# Patient Record
Sex: FEMALE | NOT HISPANIC OR LATINO | Employment: UNEMPLOYED | URBAN - METROPOLITAN AREA
[De-identification: names, ages, dates, MRNs, and addresses within clinical notes are randomized per-mention and may not be internally consistent; named-entity substitution may affect disease eponyms.]

---

## 2017-03-24 RX ORDER — LEVOTHYROXINE SODIUM 50 UG/1
TABLET ORAL
Qty: 30 TABLET | Refills: 0 | Status: SHIPPED | OUTPATIENT
Start: 2017-03-24 | End: 2018-04-23 | Stop reason: SDUPTHER

## 2017-03-27 NOTE — TELEPHONE ENCOUNTER
Left a message on voice mail informing pt that a 30 day refill was sent to the pharmacy. Will need a f/u to get further refills.

## 2017-08-16 ENCOUNTER — TELEPHONE (OUTPATIENT)
Dept: INTERNAL MEDICINE | Facility: CLINIC | Age: 52
End: 2017-08-16

## 2017-08-16 NOTE — TELEPHONE ENCOUNTER
----- Message from Bev Vernon sent at 8/16/2017  3:23 PM CDT -----  Contact: [facility]   [phone] ext [phone]  Idalia ,,, calling to get pt a hospital follow up, pt will be released next week,,, system had no appt,, please call back

## 2017-08-17 NOTE — TELEPHONE ENCOUNTER
Called Idalia and let her know that Dr. Gerber worked her in for 8-29-17 at 1:00 pm.  She will fax records over to us.

## 2017-08-17 NOTE — TELEPHONE ENCOUNTER
Idalia called back, she said that she was admitted to the behavioral unit for mood instability, medication non compliance, and she needs to follow up with PCP, first available is not until end of Oct.   She was last seen .  Did you want to try and work in sooner than Oct?  Pt is set up with psych , so this is not to deal with her psych issues .

## 2017-09-18 RX ORDER — LEVOTHYROXINE SODIUM 50 UG/1
TABLET ORAL
Qty: 30 TABLET | OUTPATIENT
Start: 2017-09-18

## 2018-04-23 ENCOUNTER — OFFICE VISIT (OUTPATIENT)
Dept: INTERNAL MEDICINE | Facility: CLINIC | Age: 53
End: 2018-04-23
Payer: MEDICAID

## 2018-04-23 VITALS
WEIGHT: 148.13 LBS | HEART RATE: 78 BPM | HEIGHT: 64 IN | SYSTOLIC BLOOD PRESSURE: 116 MMHG | TEMPERATURE: 98 F | BODY MASS INDEX: 25.29 KG/M2 | DIASTOLIC BLOOD PRESSURE: 74 MMHG

## 2018-04-23 DIAGNOSIS — Z12.11 COLON CANCER SCREENING: ICD-10-CM

## 2018-04-23 DIAGNOSIS — Z12.39 BREAST CANCER SCREENING: ICD-10-CM

## 2018-04-23 DIAGNOSIS — Z00.00 ANNUAL PHYSICAL EXAM: Primary | ICD-10-CM

## 2018-04-23 PROCEDURE — 90471 IMMUNIZATION ADMIN: CPT | Mod: PBBFAC,PO

## 2018-04-23 PROCEDURE — 99213 OFFICE O/P EST LOW 20 MIN: CPT | Mod: PBBFAC,PO | Performed by: FAMILY MEDICINE

## 2018-04-23 PROCEDURE — 99999 PR PBB SHADOW E&M-EST. PATIENT-LVL III: CPT | Mod: PBBFAC,,, | Performed by: FAMILY MEDICINE

## 2018-04-23 PROCEDURE — 81001 URINALYSIS AUTO W/SCOPE: CPT

## 2018-04-23 PROCEDURE — 99396 PREV VISIT EST AGE 40-64: CPT | Mod: S$PBB,,, | Performed by: FAMILY MEDICINE

## 2018-04-23 RX ORDER — LEVOTHYROXINE SODIUM 50 UG/1
50 TABLET ORAL DAILY
Qty: 30 TABLET | Refills: 0 | Status: SHIPPED | OUTPATIENT
Start: 2018-04-23 | End: 2018-04-27 | Stop reason: SDUPTHER

## 2018-04-24 ENCOUNTER — TELEPHONE (OUTPATIENT)
Dept: INTERNAL MEDICINE | Facility: CLINIC | Age: 53
End: 2018-04-24

## 2018-04-24 LAB
BACTERIA #/AREA URNS AUTO: ABNORMAL /HPF
BILIRUB UR QL STRIP: NEGATIVE
CLARITY UR REFRACT.AUTO: ABNORMAL
COLOR UR AUTO: ABNORMAL
GLUCOSE UR QL STRIP: NEGATIVE
HGB UR QL STRIP: NEGATIVE
HYALINE CASTS UR QL AUTO: 7 /LPF
KETONES UR QL STRIP: NEGATIVE
LEUKOCYTE ESTERASE UR QL STRIP: ABNORMAL
MICROSCOPIC COMMENT: ABNORMAL
NITRITE UR QL STRIP: NEGATIVE
PH UR STRIP: 5 [PH] (ref 5–8)
PROT UR QL STRIP: ABNORMAL
RBC #/AREA URNS AUTO: 0 /HPF (ref 0–4)
SP GR UR STRIP: 1.03 (ref 1–1.03)
SQUAMOUS #/AREA URNS AUTO: 46 /HPF
URN SPEC COLLECT METH UR: ABNORMAL
UROBILINOGEN UR STRIP-ACNC: NEGATIVE EU/DL
WBC #/AREA URNS AUTO: 4 /HPF (ref 0–5)
YEAST UR QL AUTO: ABNORMAL

## 2018-04-24 RX ORDER — FLUCONAZOLE 150 MG/1
150 TABLET ORAL ONCE
Qty: 1 TABLET | Refills: 0 | Status: SHIPPED | OUTPATIENT
Start: 2018-04-24 | End: 2018-04-24

## 2018-04-24 RX ORDER — CIPROFLOXACIN 500 MG/1
500 TABLET ORAL EVERY 12 HOURS
Qty: 6 TABLET | Refills: 0 | Status: SHIPPED | OUTPATIENT
Start: 2018-04-24 | End: 2018-04-27

## 2018-04-24 NOTE — TELEPHONE ENCOUNTER
Pt has UTI and some yeast present.    Take Cipro x 3 days.  Then wait 3 days and take a diflucan  Drink more water.

## 2018-04-24 NOTE — PROGRESS NOTES
"Subjective:      Patient ID: Merly Benavides is a 52 y.o. female.    Chief Complaint: Annual Exam and Urinary Tract Infection    HPI  51 yo female here to update annual visit/labs and med refill  She states she has gotten off of chronic Xanax use.  Notices some tingling in legs now occ.  She cont to smoke/stressed/going thru divorce.  Does not want to have colonoscopy but agreeable to fitkit.  Urine with strong smell.  No dysuria/hematuria.  Been taking some thyroid med that she bought online    Past Medical History:   Diagnosis Date    Bulging discs     Hyperlipidemia     Thyroid disease      Family History   Problem Relation Age of Onset    Rheum arthritis Mother     Osteoarthritis Mother     Heart disease Neg Hx     Stroke Neg Hx     Diabetes Neg Hx     Colon cancer Neg Hx     Breast cancer Maternal Grandmother      Past Surgical History:   Procedure Laterality Date    THYROIDECTOMY      TUBAL LIGATION       Social History   Substance Use Topics    Smoking status: Current Every Day Smoker     Packs/day: 1.00     Years: 27.00    Smokeless tobacco: Not on file    Alcohol use No       /74   Pulse 78   Temp 97.8 °F (36.6 °C)   Ht 5' 4" (1.626 m)   Wt 67.2 kg (148 lb 2.4 oz)   BMI 25.43 kg/m²     Review of Systems   Constitutional: Negative for appetite change, chills and fever.   HENT: Negative for ear pain, hearing loss, postnasal drip, rhinorrhea and tinnitus.    Eyes: Negative for visual disturbance.   Respiratory: Negative for cough, shortness of breath and wheezing.    Cardiovascular: Negative for chest pain, palpitations and leg swelling.   Gastrointestinal: Negative for abdominal distention, abdominal pain, constipation and diarrhea.   Genitourinary: Negative for dysuria, frequency, hematuria and urgency.   Musculoskeletal: Negative for gait problem and joint swelling.   Neurological: Negative for weakness and headaches.   Hematological: Negative for adenopathy. "   Psychiatric/Behavioral: Negative for confusion and decreased concentration.       Objective:     Physical Exam   Constitutional: She is oriented to person, place, and time. She appears well-developed and well-nourished. No distress.   HENT:   Right Ear: External ear normal.   Left Ear: External ear normal.   Nose: Nose normal.   Mouth/Throat: Oropharynx is clear and moist.   Eyes: Conjunctivae are normal. Pupils are equal, round, and reactive to light.   Neck: Normal range of motion. Neck supple. Carotid bruit is not present.   Cardiovascular: Normal rate, regular rhythm and normal heart sounds.    Pulmonary/Chest: Effort normal and breath sounds normal. No respiratory distress. She has no wheezes. She has no rales.   Abdominal: Soft. Bowel sounds are normal. She exhibits no distension. There is no tenderness. There is no guarding.   Musculoskeletal: She exhibits no edema.   Neurological: She is alert and oriented to person, place, and time. No cranial nerve deficit.   Skin: Skin is warm and dry. No rash noted.   Psychiatric: She has a normal mood and affect. Her behavior is normal. Judgment and thought content normal.   Nursing note and vitals reviewed.      Lab Results   Component Value Date    WBC 13.16 (H) 12/16/2015    HGB 13.0 12/16/2015    HCT 40.1 12/16/2015     (H) 12/16/2015    CHOL 196 12/16/2015    TRIG 204 (H) 12/16/2015    HDL 47 12/16/2015    ALT 8 (L) 12/16/2015    AST 12 12/16/2015     12/16/2015    K 3.2 (L) 12/16/2015     12/16/2015    CREATININE 0.7 12/16/2015    BUN 7 12/16/2015    CO2 24 12/16/2015    TSH 0.838 12/16/2015       Assessment:     1. Annual physical exam    2. Breast cancer screening    3. Colon cancer screening         Plan:     Annual physical exam  -     CBC auto differential; Future; Expected date: 04/23/2018  -     Comprehensive metabolic panel; Future; Expected date: 04/23/2018  -     Hemoglobin A1c; Future; Expected date: 04/23/2018  -     Lipid panel;  Future; Expected date: 04/23/2018  -     TSH; Future; Expected date: 04/23/2018  -     URINALYSIS  -     Vitamin B12; Future; Expected date: 04/23/2018  -     Ambulatory referral to Gynecology    Breast cancer screening  -     Mammo Digital Screening Bilat with CAD; Future; Expected date: 04/23/2018    Colon cancer screening  -     Fecal Immunochemical Test (iFOBT); Future; Expected date: 04/23/2018    Other orders  -     (In Office Administered) Tdap Vaccine  -     levothyroxine (SYNTHROID) 50 MCG tablet; Take 1 tablet (50 mcg total) by mouth once daily.  Dispense: 30 tablet; Refill: 0  -     Urinalysis Microscopic    Update annual labs  UA with bacteria/yeast.  3 days of cipro.  Increase water intake  Diflucan x 1  Fit Kit sent with pt  Update Gyn/Mammo  Adacel today  Focus on smoking cessation  Med refill, will adjust if needed  F/u annually and PRN  Declines pneumonia shot

## 2018-04-26 ENCOUNTER — LAB VISIT (OUTPATIENT)
Dept: LAB | Facility: HOSPITAL | Age: 53
End: 2018-04-26
Attending: FAMILY MEDICINE
Payer: MEDICAID

## 2018-04-26 DIAGNOSIS — Z00.00 ANNUAL PHYSICAL EXAM: ICD-10-CM

## 2018-04-26 LAB
ALBUMIN SERPL BCP-MCNC: 3.7 G/DL
ALP SERPL-CCNC: 101 U/L
ALT SERPL W/O P-5'-P-CCNC: 13 U/L
ANION GAP SERPL CALC-SCNC: 12 MMOL/L
AST SERPL-CCNC: 23 U/L
BASOPHILS # BLD AUTO: 0.04 K/UL
BASOPHILS NFR BLD: 0.3 %
BILIRUB SERPL-MCNC: 0.3 MG/DL
BUN SERPL-MCNC: 12 MG/DL
CALCIUM SERPL-MCNC: 9 MG/DL
CHLORIDE SERPL-SCNC: 105 MMOL/L
CHOLEST SERPL-MCNC: 217 MG/DL
CHOLEST/HDLC SERPL: 4.4 {RATIO}
CO2 SERPL-SCNC: 23 MMOL/L
CREAT SERPL-MCNC: 0.8 MG/DL
DIFFERENTIAL METHOD: ABNORMAL
EOSINOPHIL # BLD AUTO: 0.4 K/UL
EOSINOPHIL NFR BLD: 3.5 %
ERYTHROCYTE [DISTWIDTH] IN BLOOD BY AUTOMATED COUNT: 13.7 %
EST. GFR  (AFRICAN AMERICAN): >60 ML/MIN/1.73 M^2
EST. GFR  (NON AFRICAN AMERICAN): >60 ML/MIN/1.73 M^2
ESTIMATED AVG GLUCOSE: 100 MG/DL
GLUCOSE SERPL-MCNC: 68 MG/DL
HBA1C MFR BLD HPLC: 5.1 %
HCT VFR BLD AUTO: 40.9 %
HDLC SERPL-MCNC: 49 MG/DL
HDLC SERPL: 22.6 %
HGB BLD-MCNC: 12.8 G/DL
IMM GRANULOCYTES # BLD AUTO: 0.03 K/UL
IMM GRANULOCYTES NFR BLD AUTO: 0.3 %
LDLC SERPL CALC-MCNC: 131.2 MG/DL
LYMPHOCYTES # BLD AUTO: 3.2 K/UL
LYMPHOCYTES NFR BLD: 27.2 %
MCH RBC QN AUTO: 30.5 PG
MCHC RBC AUTO-ENTMCNC: 31.3 G/DL
MCV RBC AUTO: 97 FL
MONOCYTES # BLD AUTO: 0.6 K/UL
MONOCYTES NFR BLD: 5.4 %
NEUTROPHILS # BLD AUTO: 7.4 K/UL
NEUTROPHILS NFR BLD: 63.3 %
NONHDLC SERPL-MCNC: 168 MG/DL
NRBC BLD-RTO: 0 /100 WBC
PLATELET # BLD AUTO: 429 K/UL
PMV BLD AUTO: 10.3 FL
POTASSIUM SERPL-SCNC: 4.3 MMOL/L
PROT SERPL-MCNC: 7 G/DL
RBC # BLD AUTO: 4.2 M/UL
SODIUM SERPL-SCNC: 140 MMOL/L
TRIGL SERPL-MCNC: 184 MG/DL
TSH SERPL DL<=0.005 MIU/L-ACNC: 2.65 UIU/ML
VIT B12 SERPL-MCNC: 334 PG/ML
WBC # BLD AUTO: 11.65 K/UL

## 2018-04-26 PROCEDURE — 80061 LIPID PANEL: CPT

## 2018-04-26 PROCEDURE — 82607 VITAMIN B-12: CPT

## 2018-04-26 PROCEDURE — 85025 COMPLETE CBC W/AUTO DIFF WBC: CPT

## 2018-04-26 PROCEDURE — 83036 HEMOGLOBIN GLYCOSYLATED A1C: CPT

## 2018-04-26 PROCEDURE — 84443 ASSAY THYROID STIM HORMONE: CPT

## 2018-04-26 PROCEDURE — 36415 COLL VENOUS BLD VENIPUNCTURE: CPT | Mod: PO

## 2018-04-26 PROCEDURE — 80053 COMPREHEN METABOLIC PANEL: CPT

## 2018-04-27 ENCOUNTER — TELEPHONE (OUTPATIENT)
Dept: INTERNAL MEDICINE | Facility: CLINIC | Age: 53
End: 2018-04-27

## 2018-04-27 RX ORDER — LEVOTHYROXINE SODIUM 50 UG/1
50 TABLET ORAL DAILY
Qty: 30 TABLET | Refills: 11 | Status: SHIPPED | OUTPATIENT
Start: 2018-04-27 | End: 2019-08-06 | Stop reason: SDUPTHER

## 2018-04-27 RX ORDER — PREGABALIN 75 MG/1
75 CAPSULE ORAL 2 TIMES DAILY
Qty: 60 CAPSULE | Refills: 6 | Status: SHIPPED | OUTPATIENT
Start: 2018-04-27 | End: 2020-05-13

## 2018-04-27 NOTE — TELEPHONE ENCOUNTER
Pt said, she called the pharmacy and was told that the insurance covered the lyrica. She can't pick it up until Monday because she is out town.

## 2018-04-27 NOTE — TELEPHONE ENCOUNTER
Was discussed at visit, her chronic back pain and the occ numbness in the legs.  Lyrica helped in past.  A1C/B12 ok.  Will send lyrica

## 2018-04-27 NOTE — TELEPHONE ENCOUNTER
lv 4/23/18 ret 1 y  She states that she had mention lyrica for the tingling down her legs and wants to know if you could prescribe.

## 2018-04-27 NOTE — TELEPHONE ENCOUNTER
----- Message from Andry Morris sent at 4/27/2018  3:01 PM CDT -----  Pt states she called the pharmacy and the lyrica is covered.        Please call pt back at [phone]        ..  [facility], [facility] - [place] - [address] [facility] [address]  [address] [address] [address]  Phone: [phone] Fax: [phone]

## 2018-06-25 ENCOUNTER — TELEPHONE (OUTPATIENT)
Dept: INTERNAL MEDICINE | Facility: CLINIC | Age: 53
End: 2018-06-25

## 2018-06-25 NOTE — TELEPHONE ENCOUNTER
Called and left message for pt to call back to schedule her mammogram that was ordered in April and also check on the fecal kit test that she was given.

## 2018-06-29 ENCOUNTER — TELEPHONE (OUTPATIENT)
Dept: INTERNAL MEDICINE | Facility: CLINIC | Age: 53
End: 2018-06-29

## 2018-06-29 NOTE — TELEPHONE ENCOUNTER
----- Message from Tere Barragan sent at 6/29/2018 12:29 PM CDT -----  Contact: Merly [phone]  Patient has a prescription from the hospital for Seroquel 100 mg but has not been able to get it filled. Can the doctor call her something because she has not slept in 2 days.

## 2018-07-18 ENCOUNTER — TELEPHONE (OUTPATIENT)
Dept: INTERNAL MEDICINE | Facility: CLINIC | Age: 53
End: 2018-07-18

## 2018-07-18 RX ORDER — OLANZAPINE 20 MG/1
20 TABLET ORAL NIGHTLY
COMMUNITY
End: 2020-05-13

## 2018-07-18 RX ORDER — GABAPENTIN 300 MG/1
300 CAPSULE ORAL 3 TIMES DAILY
COMMUNITY
End: 2020-05-13

## 2018-07-18 RX ORDER — PANTOPRAZOLE SODIUM 40 MG/1
40 TABLET, DELAYED RELEASE ORAL DAILY
COMMUNITY
End: 2020-05-13

## 2018-07-18 RX ORDER — METOPROLOL SUCCINATE 25 MG/1
25 TABLET, EXTENDED RELEASE ORAL DAILY
COMMUNITY
End: 2020-05-13

## 2018-07-18 RX ORDER — TRAZODONE HYDROCHLORIDE 100 MG/1
100 TABLET ORAL NIGHTLY
COMMUNITY
End: 2020-05-13

## 2018-07-18 RX ORDER — HYDROXYZINE PAMOATE 50 MG/1
50 CAPSULE ORAL NIGHTLY
Refills: 0 | COMMUNITY
Start: 2018-07-17 | End: 2020-05-13

## 2018-07-18 RX ORDER — HALOPERIDOL 10 MG/1
10 TABLET ORAL 2 TIMES DAILY
COMMUNITY
End: 2020-05-13

## 2018-07-18 NOTE — TELEPHONE ENCOUNTER
Received discharge summary from [facility]. Med card updated with discharge medications.

## 2018-07-26 ENCOUNTER — TELEPHONE (OUTPATIENT)
Dept: INTERNAL MEDICINE | Facility: CLINIC | Age: 53
End: 2018-07-26

## 2018-07-26 NOTE — TELEPHONE ENCOUNTER
----- Message from Amalia White sent at 7/26/2018  2:29 PM CDT -----  Contact: Pt   Pt request that the medication to help her stop smoking be called into .  [employer], [facility] - [place] [facility]  [address] [facility] [address] [address] [address] [place] [address]  Phone: [phone] Fax: [phone]     Request call back..[phone] (home)

## 2018-09-04 ENCOUNTER — TELEPHONE (OUTPATIENT)
Dept: INTERNAL MEDICINE | Facility: CLINIC | Age: 53
End: 2018-09-04

## 2018-09-04 NOTE — TELEPHONE ENCOUNTER
----- Message from Arpan Beal sent at 9/4/2018 10:07 AM CDT -----  Contact: ja petres   Pt would like to be worked in for hospital f/u in the next 7 days       [phone]

## 2019-08-02 NOTE — TELEPHONE ENCOUNTER
----- Message from Yun Smith sent at 8/2/2019  9:54 AM CDT -----  Contact: self/[phone]  Type:  Sooner Apoointment Request    Caller is requesting a sooner appointment.  Caller declined first available appointment listed below.  Caller will not accept being placed on the waitlist and is requesting a message be sent to doctor.  Name of Caller:Merly Benavides    When is the first available appointment?09-17-19  Symptoms:need a 2 week hospital follow-up  Would the patient rather a call back or a response via [facility]? Call back   Best Call Back Number:[phone]  Additional Information:

## 2019-08-05 NOTE — TELEPHONE ENCOUNTER
Yes, will refill.  Need updated dose in case anything was changed during hosp stay.  Send me refill request

## 2019-08-05 NOTE — TELEPHONE ENCOUNTER
Attempted to contact pt, no answer, no vm to leave message. Called home number, left message for call back with family member.

## 2019-08-05 NOTE — TELEPHONE ENCOUNTER
Contacted pt at home number. She said, she doesn't need a hospital f/u. She needs an appt to get thyroid medicine refilled. Pt is due for an annual. Pt has an appt on 9/17. Can she get a refill until that appt?

## 2019-08-06 RX ORDER — LEVOTHYROXINE SODIUM 50 UG/1
50 TABLET ORAL DAILY
Qty: 30 TABLET | Refills: 2 | Status: SHIPPED | OUTPATIENT
Start: 2019-08-06 | End: 2020-05-13 | Stop reason: SDUPTHER

## 2019-09-29 LAB
CHOLEST SERPL-MSCNC: 187 MG/DL (ref 0–199)
HDLC SERPL-MCNC: 37 MG/DL
LDLC SERPL CALC-MCNC: 115 MG/DL (ref 0–129)
NON HDL CHOL (CALC): 150 MG/DL (ref 0–159)
TRIGL SERPL-MCNC: 177 MG/DL (ref 0–149)

## 2019-10-18 ENCOUNTER — PATIENT OUTREACH (OUTPATIENT)
Dept: ADMINISTRATIVE | Facility: HOSPITAL | Age: 54
End: 2019-10-18

## 2019-12-05 ENCOUNTER — TELEPHONE (OUTPATIENT)
Dept: INTERNAL MEDICINE | Facility: CLINIC | Age: 54
End: 2019-12-05

## 2019-12-05 NOTE — TELEPHONE ENCOUNTER
----- Message from Noreen Vernon sent at 12/5/2019  2:30 PM CST -----  Contact: self-[phone]  Would like to consult with the nurse, Patient would like to get a refill on her Medication, Please call back at  [phone], Thanks sj  .Type:  RX Refill Request    Who Called:  Ms Benavides  Refill or New Rx:Refill  RX Name and Strength:Thyroid Medication  How is the patient currently taking it? (ex. 1XDay):once a day  Is this a 30 day or 90 day RX:90  Preferred Pharmacy with phone number:.  [facility] [facility] [address] [address] [facility] [address]  [address] [facility] [address]  [facility] [address]  Phone: [phone] Fax: [phone]    [facility]  [employer] [place] - [address]  [address] [facility] [address]  [place] [address]  Phone: [phone] Fax: [phone]      Local or Mail Order Local  Ordering Provider:Dr Gerber  Would the patient rather a call back or a response via [facility]? CallBack  Best Call Back Number:[phone]  Additional Information:

## 2020-02-03 RX ORDER — LEVOTHYROXINE SODIUM 50 UG/1
TABLET ORAL
Qty: 30 TABLET | Refills: 2 | OUTPATIENT
Start: 2020-02-03

## 2020-02-24 ENCOUNTER — PATIENT OUTREACH (OUTPATIENT)
Dept: ADMINISTRATIVE | Facility: HOSPITAL | Age: 55
End: 2020-02-24

## 2020-02-24 NOTE — PROGRESS NOTES
HM reviewed and updated. Immunizations abstracted.  Care Everywhere abstracted.  Enter/edited lipid panel found in care everywhere.  Health Maintenance Due   Topic    Pneumococcal Vaccine (Medium Risk) (1 of 1 - PPSV23)    Pap Smear with HPV Cotest     Mammogram     Colonoscopy    Attempted to call pt to schedule labs prior to visit and overdue HM items.  No answer on listed cell, no vm. Called listed home number, mom said she does not live there anymore but she will give her the message.  Previsit chart audit completed.  *KDL*

## 2020-03-05 ENCOUNTER — TELEPHONE (OUTPATIENT)
Dept: INTERNAL MEDICINE | Facility: CLINIC | Age: 55
End: 2020-03-05

## 2020-03-05 NOTE — TELEPHONE ENCOUNTER
Pt has no-showed last 2 or 3 appts.    No refills until visit.  Perhaps her Psychiatrist can fill it

## 2020-03-05 NOTE — TELEPHONE ENCOUNTER
----- Message from Yun Smith sent at 3/5/2020 12:59 PM CST -----  Contact: self/[phone]  Would like to consult with nurse regarding medication for thyroids, patient states she is out and her appt is not until 04-28-20. She need a refill. Please call back at [phone]. Thanks/ar

## 2020-04-15 ENCOUNTER — TELEPHONE (OUTPATIENT)
Dept: INTERNAL MEDICINE | Facility: CLINIC | Age: 55
End: 2020-04-15

## 2020-04-15 NOTE — TELEPHONE ENCOUNTER
----- Message from Paulina Doyle sent at 4/15/2020 11:00 AM CDT -----  Contact: pt   Type:  RX Refill Request    Who Called: pt  Refill or New Rx:refill  RX Name and Strength:levothyroxine (SYNTHROID) 50 MCG tablet  How is the patient currently taking it? (ex. 1XDay): 1XDay   Is this a 30 day or 90 day RX:30  Preferred Pharmacy with phone number:listed below   Local or Mail Order:local  Ordering Provider:Dr Gerber  Would the patient rather a call back or a response via [facility]? Call back   Best Call Back Number: [phone]  Additional Information:       [facility], [facility] - [place] - [address] [address]  [address] [address] [address]  Phone: [phone] Fax: [phone]

## 2020-04-15 NOTE — TELEPHONE ENCOUNTER
You refused to fill last time and pt booked appt for 4-28th, I will have to cancel , did you want me to try and set up for video for the refill?

## 2020-04-17 NOTE — TELEPHONE ENCOUNTER
Called pt and let her know that we have not seen her in almost 2 years.  She will need a video visit with Dr. Gerber to refill medications.  Gave her instructions on signing up for portal and then downloading the [employer] umm on her Iphone.  She will do this when her  comes home from work.  I will call her back tomorrow to schedule her video visit with her.  Gave her instructions:    You will need to download the [employer] umm on your Iphone or Android phone.  You will then go into the umm and do the E pre check questions, then you will be able to do a test run to see if will work properly.  If you already have the [employer] umm and the test run does not work, you may need to delete the umm and re-install it for the updated software.  You can then log in 10-15 minutes early for the appointment.  Stay on the line and Dr. Gerber will get logged on as soon as she can.  Any questions just give us a call.

## 2020-05-13 ENCOUNTER — LAB VISIT (OUTPATIENT)
Dept: LAB | Facility: HOSPITAL | Age: 55
End: 2020-05-13
Attending: FAMILY MEDICINE
Payer: MEDICAID

## 2020-05-13 ENCOUNTER — OFFICE VISIT (OUTPATIENT)
Dept: INTERNAL MEDICINE | Facility: CLINIC | Age: 55
End: 2020-05-13
Payer: MEDICAID

## 2020-05-13 VITALS
SYSTOLIC BLOOD PRESSURE: 126 MMHG | DIASTOLIC BLOOD PRESSURE: 86 MMHG | HEART RATE: 84 BPM | BODY MASS INDEX: 24.88 KG/M2 | TEMPERATURE: 98 F | WEIGHT: 145.75 LBS | HEIGHT: 64 IN

## 2020-05-13 DIAGNOSIS — Z01.419 WELL WOMAN EXAM: Primary | ICD-10-CM

## 2020-05-13 DIAGNOSIS — Z12.11 COLON CANCER SCREENING: ICD-10-CM

## 2020-05-13 DIAGNOSIS — Z00.00 ANNUAL PHYSICAL EXAM: ICD-10-CM

## 2020-05-13 DIAGNOSIS — Z12.39 BREAST CANCER SCREENING: ICD-10-CM

## 2020-05-13 LAB
ALBUMIN SERPL BCP-MCNC: 4.1 G/DL (ref 3.5–5.2)
ALP SERPL-CCNC: 106 U/L (ref 55–135)
ALT SERPL W/O P-5'-P-CCNC: 17 U/L (ref 10–44)
ANION GAP SERPL CALC-SCNC: 13 MMOL/L (ref 8–16)
AST SERPL-CCNC: 19 U/L (ref 10–40)
BASOPHILS # BLD AUTO: 0.05 K/UL (ref 0–0.2)
BASOPHILS NFR BLD: 0.3 % (ref 0–1.9)
BILIRUB SERPL-MCNC: 0.3 MG/DL (ref 0.1–1)
BUN SERPL-MCNC: 9 MG/DL (ref 6–20)
CALCIUM SERPL-MCNC: 10 MG/DL (ref 8.7–10.5)
CHLORIDE SERPL-SCNC: 101 MMOL/L (ref 95–110)
CO2 SERPL-SCNC: 26 MMOL/L (ref 23–29)
CREAT SERPL-MCNC: 0.8 MG/DL (ref 0.5–1.4)
DIFFERENTIAL METHOD: ABNORMAL
EOSINOPHIL # BLD AUTO: 0.2 K/UL (ref 0–0.5)
EOSINOPHIL NFR BLD: 0.9 % (ref 0–8)
ERYTHROCYTE [DISTWIDTH] IN BLOOD BY AUTOMATED COUNT: 13.8 % (ref 11.5–14.5)
EST. GFR  (AFRICAN AMERICAN): >60 ML/MIN/1.73 M^2
EST. GFR  (NON AFRICAN AMERICAN): >60 ML/MIN/1.73 M^2
GLUCOSE SERPL-MCNC: 95 MG/DL (ref 70–110)
HCT VFR BLD AUTO: 42.6 % (ref 37–48.5)
HGB BLD-MCNC: 13.6 G/DL (ref 12–16)
IMM GRANULOCYTES # BLD AUTO: 0.08 K/UL (ref 0–0.04)
IMM GRANULOCYTES NFR BLD AUTO: 0.5 % (ref 0–0.5)
LYMPHOCYTES # BLD AUTO: 2 K/UL (ref 1–4.8)
LYMPHOCYTES NFR BLD: 11.3 % (ref 18–48)
MCH RBC QN AUTO: 30.5 PG (ref 27–31)
MCHC RBC AUTO-ENTMCNC: 31.9 G/DL (ref 32–36)
MCV RBC AUTO: 96 FL (ref 82–98)
MONOCYTES # BLD AUTO: 1 K/UL (ref 0.3–1)
MONOCYTES NFR BLD: 5.6 % (ref 4–15)
NEUTROPHILS # BLD AUTO: 14.3 K/UL (ref 1.8–7.7)
NEUTROPHILS NFR BLD: 81.4 % (ref 38–73)
NRBC BLD-RTO: 0 /100 WBC
PLATELET # BLD AUTO: 540 K/UL (ref 150–350)
PMV BLD AUTO: 9.5 FL (ref 9.2–12.9)
POTASSIUM SERPL-SCNC: 3.2 MMOL/L (ref 3.5–5.1)
PROT SERPL-MCNC: 7.9 G/DL (ref 6–8.4)
RBC # BLD AUTO: 4.46 M/UL (ref 4–5.4)
SODIUM SERPL-SCNC: 140 MMOL/L (ref 136–145)
TSH SERPL DL<=0.005 MIU/L-ACNC: 2.49 UIU/ML (ref 0.4–4)
WBC # BLD AUTO: 17.59 K/UL (ref 3.9–12.7)

## 2020-05-13 PROCEDURE — 99396 PR PREVENTIVE VISIT,EST,40-64: ICD-10-PCS | Mod: S$PBB,,, | Performed by: FAMILY MEDICINE

## 2020-05-13 PROCEDURE — 86703 HIV-1/HIV-2 1 RESULT ANTBDY: CPT

## 2020-05-13 PROCEDURE — 99999 PR PBB SHADOW E&M-EST. PATIENT-LVL III: ICD-10-PCS | Mod: PBBFAC,,, | Performed by: FAMILY MEDICINE

## 2020-05-13 PROCEDURE — 84443 ASSAY THYROID STIM HORMONE: CPT

## 2020-05-13 PROCEDURE — 80053 COMPREHEN METABOLIC PANEL: CPT

## 2020-05-13 PROCEDURE — 99396 PREV VISIT EST AGE 40-64: CPT | Mod: S$PBB,,, | Performed by: FAMILY MEDICINE

## 2020-05-13 PROCEDURE — 99999 PR PBB SHADOW E&M-EST. PATIENT-LVL III: CPT | Mod: PBBFAC,,, | Performed by: FAMILY MEDICINE

## 2020-05-13 PROCEDURE — 36415 COLL VENOUS BLD VENIPUNCTURE: CPT | Mod: PO

## 2020-05-13 PROCEDURE — 99213 OFFICE O/P EST LOW 20 MIN: CPT | Mod: PBBFAC,PO | Performed by: FAMILY MEDICINE

## 2020-05-13 PROCEDURE — 85025 COMPLETE CBC W/AUTO DIFF WBC: CPT

## 2020-05-13 RX ORDER — LEVOTHYROXINE SODIUM 50 UG/1
50 TABLET ORAL DAILY
Qty: 30 TABLET | Refills: 11 | Status: SHIPPED | OUTPATIENT
Start: 2020-05-13 | End: 2021-05-14 | Stop reason: SDUPTHER

## 2020-05-13 NOTE — PROGRESS NOTES
"Subjective:      Patient ID: Merly Benavides is a 54 y.o. female.    Chief Complaint: Annual Exam    HPI  55 yo female here for annual.  Followed by Dr. Aranda/psych.  On Invega injections only.  Taking thyroid med, wants name brand.  Normal BMs  Needs female exam  Needs colonoscopy  Declines vaccines  Still smoking//no desire to quit at this time    Past Medical History:   Diagnosis Date    Bulging discs     Hyperlipidemia     Thyroid disease      Family History   Problem Relation Age of Onset    Rheum arthritis Mother     Osteoarthritis Mother     Breast cancer Maternal Grandmother     Heart disease Neg Hx     Stroke Neg Hx     Diabetes Neg Hx     Colon cancer Neg Hx      Past Surgical History:   Procedure Laterality Date    THYROIDECTOMY      TUBAL LIGATION       Social History     Tobacco Use    Smoking status: Current Every Day Smoker     Packs/day: 1.00     Years: 27.00     Pack years: 27.00   Substance Use Topics    Alcohol use: No    Drug use: No       /86   Pulse 84   Temp 98.3 °F (36.8 °C) (Tympanic)   Ht 5' 4" (1.626 m)   Wt 66.1 kg (145 lb 11.6 oz)   BMI 25.01 kg/m²     Review of Systems   Constitutional: Negative for activity change, appetite change, chills, diaphoresis, fatigue, fever and unexpected weight change.   HENT: Negative for ear pain, hearing loss, postnasal drip, rhinorrhea and tinnitus.    Eyes: Negative for visual disturbance.   Respiratory: Negative for cough, shortness of breath and wheezing.    Cardiovascular: Negative for chest pain, palpitations and leg swelling.   Gastrointestinal: Negative for abdominal distention and abdominal pain.   Genitourinary: Negative for dysuria, frequency, hematuria and urgency.   Musculoskeletal: Negative for back pain and joint swelling.   Neurological: Negative for weakness and headaches.   Hematological: Negative for adenopathy.   Psychiatric/Behavioral: Negative for confusion and decreased concentration. "       Objective:     Physical Exam   Constitutional: She is oriented to person, place, and time. She appears well-developed and well-nourished. No distress.   HENT:   Head: Normocephalic and atraumatic.   Eyes: Pupils are equal, round, and reactive to light. Conjunctivae are normal.   Neck: Normal range of motion. Neck supple. Carotid bruit is not present.   Cardiovascular: Normal rate, regular rhythm and normal heart sounds.   Pulmonary/Chest: Effort normal and breath sounds normal. No respiratory distress. She has no wheezes. She has no rales.   Abdominal: Soft. Bowel sounds are normal. She exhibits no distension. There is no tenderness. There is no guarding.   Musculoskeletal: She exhibits no edema.   Neurological: She is alert and oriented to person, place, and time. No cranial nerve deficit.   Skin: Skin is warm and dry. No rash noted.   Psychiatric: Her behavior is normal. Thought content normal.   Nursing note and vitals reviewed.      Lab Results   Component Value Date    WBC 11.65 04/26/2018    HGB 12.8 04/26/2018    HCT 40.9 04/26/2018     (H) 04/26/2018    CHOL 187 09/29/2019    TRIG 177 (H) 09/29/2019    HDL 37 (L) 09/29/2019    ALT 13 04/26/2018    AST 23 04/26/2018     04/26/2018    K 4.3 04/26/2018     04/26/2018    CREATININE 0.8 04/26/2018    BUN 12 04/26/2018    CO2 23 04/26/2018    TSH 2.647 04/26/2018    HGBA1C 5.1 04/26/2018       Assessment:     1. Well woman exam    2. Breast cancer screening    3. Colon cancer screening    4. Annual physical exam         Plan:     Well woman exam  -     Ambulatory referral/consult to Gynecology; Future; Expected date: 05/20/2020    Breast cancer screening  -     Mammo Digital Screening Bilat; Future; Expected date: 05/13/2020    Colon cancer screening  -     Case request GI: COLONOSCOPY    Annual physical exam  -     CBC auto differential; Future; Expected date: 05/13/2020  -     Comprehensive metabolic panel; Future; Expected date:  05/13/2020  -     TSH; Future; Expected date: 05/13/2020  -     HIV 1/2 Ag/Ab (4th Gen); Future; Expected date: 05/13/2020    Other orders  -     levothyroxine (SYNTHROID) 50 MCG tablet; Take 1 tablet (50 mcg total) by mouth once daily. ** BRAND NAME**  Dispense: 30 tablet; Refill: 11    Update labs  Update script/advised that name brand not likely covered and she says she will pay for it.  Update female exam/mammogram  Orders for colonoscopy placed  Declines vaccines  F/u annually and PRN

## 2020-05-14 ENCOUNTER — TELEPHONE (OUTPATIENT)
Dept: INTERNAL MEDICINE | Facility: CLINIC | Age: 55
End: 2020-05-14

## 2020-05-14 DIAGNOSIS — E87.6 HYPOKALEMIA: Primary | ICD-10-CM

## 2020-05-14 DIAGNOSIS — D72.829 LEUKOCYTOSIS, UNSPECIFIED TYPE: Primary | ICD-10-CM

## 2020-05-14 DIAGNOSIS — D75.839 THROMBOCYTOSIS: ICD-10-CM

## 2020-05-14 LAB — HIV 1+2 AB+HIV1 P24 AG SERPL QL IA: NEGATIVE

## 2020-05-14 NOTE — TELEPHONE ENCOUNTER
Thyroid function looks ok.  Potassium is a bit low.  Not sure why.  Ask pt to increase oral potassium thru diet.  Can mail her a diet.  Recheck levels in 2 wks.  Her blood counts are abnormal.  Unsure of why, recommend seeing Hematology/consult in

## 2020-05-14 NOTE — LETTER
May 21, 2020    Merly Benavides  [address] [address] [address]             [facility]  [address] [facility] [address]  Phone: [phone]  Fax: [phone] Dear Mrs. Benavides,    We have been unable to contact you by phone to discuss your test results.     Please give us a call at your earliest convenience.          Sincerely,        Gabe Somers LPN

## 2020-05-19 NOTE — TELEPHONE ENCOUNTER
Attempted to contact pt, no answer. No voice mail to leave message. Left message for call back on 's voice mail.

## 2020-05-21 NOTE — TELEPHONE ENCOUNTER
Called pt and gave results.  Booked with Dr. Abram Leigh / Son at [facility] for 7-1-2020 at 3:00 pm    Labs 6-4-2020 at 2:00 pm for the lab in [facility].

## 2020-05-21 NOTE — TELEPHONE ENCOUNTER
Attempted to contact pt, no answer. No voice mail to leave message. Left message for call back on 's voice mail. Letter mailed to pt.

## 2020-05-28 ENCOUNTER — TELEPHONE (OUTPATIENT)
Dept: GASTROENTEROLOGY | Facility: CLINIC | Age: 55
End: 2020-05-28

## 2020-06-08 ENCOUNTER — TELEPHONE (OUTPATIENT)
Dept: GASTROENTEROLOGY | Facility: CLINIC | Age: 55
End: 2020-06-08

## 2020-06-30 PROBLEM — D75.839 THROMBOCYTOSIS: Status: ACTIVE | Noted: 2020-06-30

## 2020-06-30 PROBLEM — D72.829 LEUCOCYTOSIS: Status: ACTIVE | Noted: 2020-06-30

## 2020-07-01 ENCOUNTER — TELEPHONE (OUTPATIENT)
Dept: HEMATOLOGY/ONCOLOGY | Facility: CLINIC | Age: 55
End: 2020-07-01

## 2020-07-01 NOTE — TELEPHONE ENCOUNTER
Attempted to contact pt regarding her missed appt today with Dr. Leigh. No answer unable to leave a  Message.

## 2021-05-14 RX ORDER — LEVOTHYROXINE SODIUM 50 UG/1
50 TABLET ORAL DAILY
Qty: 30 TABLET | Refills: 1 | Status: SHIPPED | OUTPATIENT
Start: 2021-05-14 | End: 2021-07-21 | Stop reason: SDUPTHER

## 2021-07-14 DIAGNOSIS — Z12.31 OTHER SCREENING MAMMOGRAM: ICD-10-CM

## 2021-07-22 RX ORDER — SERTRALINE HYDROCHLORIDE 50 MG/1
50 TABLET, FILM COATED ORAL EVERY MORNING
COMMUNITY
Start: 2021-07-12 | End: 2021-08-06 | Stop reason: SDUPTHER

## 2021-07-22 RX ORDER — TRAZODONE HYDROCHLORIDE 50 MG/1
TABLET ORAL
COMMUNITY
Start: 2021-06-24

## 2021-07-22 RX ORDER — LEVOTHYROXINE SODIUM 50 UG/1
50 TABLET ORAL DAILY
Qty: 30 TABLET | Refills: 1 | Status: SHIPPED | OUTPATIENT
Start: 2021-07-22 | End: 2021-07-22 | Stop reason: SDUPTHER

## 2021-07-22 RX ORDER — IBUPROFEN 800 MG/1
800 TABLET ORAL EVERY 8 HOURS PRN
COMMUNITY
Start: 2021-05-17

## 2021-07-22 RX ORDER — CLONAZEPAM 0.5 MG/1
TABLET ORAL
COMMUNITY
Start: 2021-06-16

## 2021-07-23 RX ORDER — LEVOTHYROXINE SODIUM 50 UG/1
50 TABLET ORAL DAILY
Qty: 30 TABLET | Refills: 1 | Status: SHIPPED | OUTPATIENT
Start: 2021-07-23 | End: 2021-08-06 | Stop reason: SDUPTHER

## 2021-08-05 ENCOUNTER — TELEPHONE (OUTPATIENT)
Dept: INTERNAL MEDICINE | Facility: CLINIC | Age: 56
End: 2021-08-05

## 2021-08-06 ENCOUNTER — OFFICE VISIT (OUTPATIENT)
Dept: INTERNAL MEDICINE | Facility: CLINIC | Age: 56
End: 2021-08-06
Payer: MEDICAID

## 2021-08-06 VITALS
BODY MASS INDEX: 24.56 KG/M2 | WEIGHT: 143.06 LBS | DIASTOLIC BLOOD PRESSURE: 68 MMHG | SYSTOLIC BLOOD PRESSURE: 102 MMHG | HEART RATE: 113 BPM | TEMPERATURE: 98 F

## 2021-08-06 DIAGNOSIS — R39.9 UTI SYMPTOMS: Primary | ICD-10-CM

## 2021-08-06 DIAGNOSIS — F43.21 GRIEF REACTION: ICD-10-CM

## 2021-08-06 DIAGNOSIS — E86.0 DEHYDRATION: ICD-10-CM

## 2021-08-06 DIAGNOSIS — F32.9 REACTIVE DEPRESSION: ICD-10-CM

## 2021-08-06 LAB
BILIRUB SERPL-MCNC: NEGATIVE MG/DL
BLOOD URINE, POC: 250
CLARITY, POC UA: ABNORMAL
COLOR, POC UA: ABNORMAL
GLUCOSE UR QL STRIP: NORMAL
KETONES UR QL STRIP: NEGATIVE
LEUKOCYTE ESTERASE URINE, POC: POSITIVE
NITRITE, POC UA: POSITIVE
PH, POC UA: 5
PROTEIN, POC: ABNORMAL
SPECIFIC GRAVITY, POC UA: 1.02
UROBILINOGEN, POC UA: NORMAL

## 2021-08-06 PROCEDURE — 87088 URINE BACTERIA CULTURE: CPT | Performed by: PHYSICIAN ASSISTANT

## 2021-08-06 PROCEDURE — 87077 CULTURE AEROBIC IDENTIFY: CPT | Performed by: PHYSICIAN ASSISTANT

## 2021-08-06 PROCEDURE — 99214 OFFICE O/P EST MOD 30 MIN: CPT | Mod: S$PBB,,, | Performed by: PHYSICIAN ASSISTANT

## 2021-08-06 PROCEDURE — 81002 URINALYSIS NONAUTO W/O SCOPE: CPT | Mod: PBBFAC,PO | Performed by: PHYSICIAN ASSISTANT

## 2021-08-06 PROCEDURE — 99213 OFFICE O/P EST LOW 20 MIN: CPT | Mod: PBBFAC,PO,25 | Performed by: PHYSICIAN ASSISTANT

## 2021-08-06 PROCEDURE — 99214 PR OFFICE/OUTPT VISIT, EST, LEVL IV, 30-39 MIN: ICD-10-PCS | Mod: S$PBB,,, | Performed by: PHYSICIAN ASSISTANT

## 2021-08-06 PROCEDURE — 87186 SC STD MICRODIL/AGAR DIL: CPT | Performed by: PHYSICIAN ASSISTANT

## 2021-08-06 PROCEDURE — 81001 URINALYSIS AUTO W/SCOPE: CPT | Performed by: PHYSICIAN ASSISTANT

## 2021-08-06 PROCEDURE — 99999 PR PBB SHADOW E&M-EST. PATIENT-LVL III: CPT | Mod: PBBFAC,,, | Performed by: PHYSICIAN ASSISTANT

## 2021-08-06 PROCEDURE — 99999 PR PBB SHADOW E&M-EST. PATIENT-LVL III: ICD-10-PCS | Mod: PBBFAC,,, | Performed by: PHYSICIAN ASSISTANT

## 2021-08-06 PROCEDURE — 96372 THER/PROPH/DIAG INJ SC/IM: CPT | Mod: PBBFAC,PO

## 2021-08-06 PROCEDURE — 87086 URINE CULTURE/COLONY COUNT: CPT | Performed by: PHYSICIAN ASSISTANT

## 2021-08-06 RX ORDER — CEFTRIAXONE 500 MG/1
500 INJECTION, POWDER, FOR SOLUTION INTRAMUSCULAR; INTRAVENOUS
Status: COMPLETED | OUTPATIENT
Start: 2021-08-06 | End: 2021-08-06

## 2021-08-06 RX ORDER — SERTRALINE HYDROCHLORIDE 50 MG/1
50 TABLET, FILM COATED ORAL EVERY MORNING
Qty: 30 TABLET | Refills: 2 | Status: SHIPPED | OUTPATIENT
Start: 2021-08-06

## 2021-08-06 RX ORDER — LEVOTHYROXINE SODIUM 50 UG/1
50 TABLET ORAL DAILY
Qty: 30 TABLET | Refills: 2 | Status: SHIPPED | OUTPATIENT
Start: 2021-08-06 | End: 2022-07-18 | Stop reason: SDUPTHER

## 2021-08-06 RX ORDER — CIPROFLOXACIN 250 MG/1
250 TABLET, FILM COATED ORAL EVERY 8 HOURS
Qty: 15 TABLET | Refills: 0 | Status: SHIPPED | OUTPATIENT
Start: 2021-08-06 | End: 2021-08-11

## 2021-08-06 RX ADMIN — CEFTRIAXONE SODIUM 500 MG: 500 INJECTION, POWDER, FOR SOLUTION INTRAMUSCULAR; INTRAVENOUS at 02:08

## 2021-08-07 LAB
BACTERIA #/AREA URNS AUTO: ABNORMAL /HPF
BILIRUB UR QL STRIP: NEGATIVE
CLARITY UR REFRACT.AUTO: ABNORMAL
COLOR UR AUTO: ABNORMAL
GLUCOSE UR QL STRIP: NEGATIVE
HGB UR QL STRIP: ABNORMAL
HYALINE CASTS UR QL AUTO: 0 /LPF
KETONES UR QL STRIP: NEGATIVE
LEUKOCYTE ESTERASE UR QL STRIP: ABNORMAL
MICROSCOPIC COMMENT: ABNORMAL
NITRITE UR QL STRIP: NEGATIVE
PH UR STRIP: 5 [PH] (ref 5–8)
PROT UR QL STRIP: ABNORMAL
RBC #/AREA URNS AUTO: 1 /HPF (ref 0–4)
SP GR UR STRIP: 1.02 (ref 1–1.03)
SQUAMOUS #/AREA URNS AUTO: 4 /HPF
URN SPEC COLLECT METH UR: ABNORMAL
WBC #/AREA URNS AUTO: 17 /HPF (ref 0–5)
WBC CLUMPS UR QL AUTO: ABNORMAL

## 2021-08-09 LAB — BACTERIA UR CULT: ABNORMAL

## 2022-02-10 ENCOUNTER — PATIENT OUTREACH (OUTPATIENT)
Dept: ADMINISTRATIVE | Facility: HOSPITAL | Age: 57
End: 2022-02-10
Payer: MEDICAID

## 2022-02-10 NOTE — PROGRESS NOTES
Contacted patient to follow up on overdue Colonoscopy. Patient declined to schedule at this time

## 2022-07-18 NOTE — TELEPHONE ENCOUNTER
----- Message from Paulina Maza sent at 7/18/2022  4:28 PM CDT -----  Type:  RX Refill Request    Who Called: Merly Song or New Rx: refill   RX Name and Strength:levothyroxine (SYNTHROID) 50 MCG tablet  How is the patient currently taking it? (ex. 1XDay): 1 X daily  Is this a 30 day or 90 day RX: 30    Preferred Pharmacy with phone number:   [facility], [facility] [address] [address] [address]  [address] [address] [address]  Phone: [phone] Fax: [phone]       Local or Mail Order: local    Ordering Provider: Buzz    Would the patient rather a call back or a response via [facility]? call    Best Call Back Number: [phone]     Additional Information: pt need refill

## 2022-07-18 NOTE — TELEPHONE ENCOUNTER
No new care gaps identified.  [facility] Embedded Care Gaps. Reference number: [id number]. 7/18/2022   4:31:08 PM CDT

## 2022-07-19 RX ORDER — LEVOTHYROXINE SODIUM 50 UG/1
50 TABLET ORAL DAILY
Qty: 30 TABLET | Refills: 2 | Status: SHIPPED | OUTPATIENT
Start: 2022-07-19

## 2022-10-11 ENCOUNTER — TELEPHONE (OUTPATIENT)
Dept: INTERNAL MEDICINE | Facility: CLINIC | Age: 57
End: 2022-10-11
Payer: MEDICAID

## 2022-10-11 NOTE — TELEPHONE ENCOUNTER
"I spoke with pt's mom. Pt is homebound and will not get out of bed due to mental illness. Per her mom, they are working with [facility] to get help and treatment for her mental illness and get someone to come to their house for " treatment"   "

## 2022-12-07 DIAGNOSIS — Z12.31 OTHER SCREENING MAMMOGRAM: ICD-10-CM

## 2022-12-14 ENCOUNTER — PATIENT OUTREACH (OUTPATIENT)
Dept: ADMINISTRATIVE | Facility: HOSPITAL | Age: 57
End: 2022-12-14
Payer: MEDICAID

## 2023-02-21 ENCOUNTER — TELEPHONE (OUTPATIENT)
Dept: INTERNAL MEDICINE | Facility: CLINIC | Age: 58
End: 2023-02-21
Payer: MEDICAID

## 2023-02-21 RX ORDER — LEVOTHYROXINE SODIUM 50 UG/1
50 TABLET ORAL DAILY
Qty: 30 TABLET | Refills: 0 | OUTPATIENT
Start: 2023-02-21

## 2023-02-21 NOTE — TELEPHONE ENCOUNTER
Care Due:                  Date            Visit Type   Department     Provider  --------------------------------------------------------------------------------    Last Visit: None Found      None         None Found  Next Visit: None Scheduled  None         None Found                                                            Last  Test          Frequency    Reason                     Performed    Due Date  --------------------------------------------------------------------------------    Office Visit  12 months..  levothyroxine............  Not Found    Overdue    TSH.........  12 months..  levothyroxine............  Not Found    Overdue    [employer] Embedded Care Gaps. Reference number: [id number]. 2/21/2023   8:35:37 AM CST

## 2023-02-21 NOTE — TELEPHONE ENCOUNTER
LV 5/13/20, no f/u  Attempted to return call to number in message, no answer. No voice mail to leave messasge

## 2023-02-21 NOTE — TELEPHONE ENCOUNTER
----- Message from Loraine Florian sent at 2/21/2023  8:42 AM CST -----  Contact: Marjorie  Type:  Patient Returning Call    Who Called:Marjorie  Who Left Message for Patient:nurse  Does the patient know what this is regarding?:prescription  Would the patient rather a call back or a response via [facility]? Call back  Best Call Back Number:[phone]  Additional Information: Pt will not leave the house to go to the dr.       [place]  TS

## 2023-02-21 NOTE — TELEPHONE ENCOUNTER
----- Message from Earlene Moreno sent at 2/20/2023  4:50 PM CST -----  Contact: Marjorie/ Mom  Marjorie stated the patient is out of Synthroid 50mcg but due to her mental state and condition she is unable to come in for an appointment. Please call her back at [phone].    Please send it to:   [facility] - [place] - [address] [facility] [facility] [address] [address] [address] [address]  Phone: [phone] Fax: [phone]

## 2023-02-21 NOTE — TELEPHONE ENCOUNTER
Pt's mother states, pt is under [facility] and will not leave the house. I explained to her that the refill request was sent to Dr. Gerber, but it may not be approved because she hasn't been seen since 5/2020. She said, she doesn't know what to do. Advised her to contact the nurse with the home health agency and find out if there is a medical doctor with their staff who can refill medicine. She verbalized understanding. Number I got of the internet was given to her because she doesn't have a number for the company.

## 2023-02-21 NOTE — TELEPHONE ENCOUNTER
Notified pt's mother that refill was denied. Pt will need to be seen before medicine can be refilled. She verbalized understanding.